# Patient Record
(demographics unavailable — no encounter records)

---

## 2025-03-17 NOTE — PHYSICAL EXAM
[de-identified] : General appearance: well-nourished and hydrated, pleasant, alert and oriented x 3, cooperative. HEENT: Normocephalic, EOM intact, Nasal septum midline, Oral cavity clear, External auditory canal clear. Cardiovascular: spider veins, no lower leg edema, no varicosities, pedal pulses are palpable. Lymphatics Lymph nodes: none palpated, Lymphedema: not present. Neurologic: sensation is normal, no muscle weakness in upper or lower extremities, patella tendon reflexes intact. Dermatologic: no apparent skin lesions, moist, warm, no rash. Spine: cervical spine appears normal and moves freely, thoracic spine appears normal and moves freely, lumbosacral spine appears normal and moves freely. Gait: nonantalgic.   Left Knee Inspection: no effusion Wounds: healed midline incision Alignment: normal. Palpation: no specific tenderness on palpation. ROM: Active (in degrees): 0-100 with no instability Ligamentous laxity (neg): increased AP translation, medial and lateral laxity 3-5 mm, negative ant. drawer test, negative post. drawer test, stable to varus stress test, stable to valgus stress test, Patellofemoral Alignment Test: Q angle-, normal. Muscle Test: good quad strength. Leg examination: calf is soft and non-tender.   Right Knee Inspection: trace effusion. Wounds: none. Alignment: normal. Palpation: medial joint line tenderness on palpation. ROM active (in degrees): 0-120 with crepitus through arc of motion. Ligamentous laxity: all ligaments appear stable, negative ant. drawer test, negative post. drawer test, stable to varus stress test, stable to valgus stress test. negative Lachman's test, negative pivot shift test Meniscal Test: negative McMurrays, negative Dutch. Patellofemoral Alignment Test: Q angle-, normal. Muscle Test: good quad strength. Leg examination: calf is soft and non-tender. [de-identified] : Left knee x-ray, AP, lateral, merchant view taken at the office today demonstrates a total knee replacement in satisfactory position and alignment. No evidence of loosening. The patella sits in a central position. No change from prior films.  Right knee x-rays, standing AP/Lateral and Merchant films, and 45-degree PA standing view, taken at the office today shows diffuse degenerative arthritis, lateral and medial joint space narrowing, significant osteophytes, patella at appropriate height and central position, patellofemoral joint space narrowing, Kellgren and Mervin grade 3-4.

## 2025-03-17 NOTE — ADDENDUM
[FreeTextEntry1] : This note was written by Deidra Mota on 03/17/2025 acting as scribe for Dr. Kashif Bautista M.D.   I, Dr. Kashif Bautista, have read and attest that all the information, medical decision making and discharge instructions within are true and accurate.

## 2025-03-17 NOTE — CONSULT LETTER
[Dear  ___] : Dear  [unfilled], [Consult Letter:] : I had the pleasure of evaluating your patient, [unfilled]. [Please see my note below.] : Please see my note below. [Consult Closing:] : Thank you very much for allowing me to participate in the care of this patient.  If you have any questions, please do not hesitate to contact me. [Sincerely,] : Sincerely, [FreeTextEntry2] : YAZAN STRICKLAND

## 2025-03-17 NOTE — DISCUSSION/SUMMARY
[de-identified] : Discussed at length the nature of the patient's condition. Their right knee symptoms appear secondary to degenerative arthritis. She is showing improvement therefore, we will continue nonoperatively. In the interim, I renewed her PT script, weight management, and Diclofenac. They can continue activities as tolerated. I did refer her to Dr. Dobbins for an evaluation of her right shoulder.   Regarding her left TKR, it is overall functioning well. I did review x-rays with them and compared them to prior films.   I will see them back in 6 months with x-rays of both knees.

## 2025-03-17 NOTE — HISTORY OF PRESENT ILLNESS
[de-identified] : NITISH JOSHUA  64 year old female presents for evaluation of right knee OA. She saw us a year ago when she was having pain for which we discussed surgery. However, she went to PT which helped significantly so she canceled surgery. Pt still attends PT once a week which helps. She walks for exercise and takes diclofenac mostly for arthritis in her feet. Regarding her left TKR done by Dr. Banda in 2013, she is doing well but does have an occasional twinge in that knee.

## 2025-04-09 NOTE — DISCUSSION/SUMMARY
[de-identified] : Right shoulder biceps tendinitis with significant tenderness in the long head of the biceps tendon.  She has positive biceps testing.  We discussed diagnostic and treatment options at length.  She requested injection today  Injection: Right shoulder biceps sheath Indication: Biceps tendinitis A discussion was had with the patient regarding this procedure and all questions were answered. All risks, benefits and alternatives were discussed. These included but were not limited to bleeding, infection, and allergic reaction. Alcohol was used to clean the skin, and betadine was used to sterilize and prep the area in the anterior aspect of the right shoulder. Ethyl chloride spray was then used as a topical anesthetic. A 21-gauge needle was used to inject 4cc of 1% lidocaine and 1cc of 40mg/ml methylprednisolone into the right biceps sheath. A sterile bandage was then applied. The patient tolerated the procedure well and there were no complications.   She will continue diclofenac as needed for pain which she has a prescription for Tylenol anti-inflammatory medications as needed for pain.  Will follow-up as needed if not improved consider MRI

## 2025-04-09 NOTE — PHYSICAL EXAM
[de-identified] : General Exam 	Well developed, well nourished 	No apparent distress 	Oriented to person, place, and time 	Mood: Normal 	Affect: Normal 	Balance and coordination: Normal 	Gait: Normal  Right shoulder exam  Inspection: No swelling, ecchymosis or gross deformity. Skin: No masses, No lesions Tenderness: + bicipital tenderness, no tenderness to the greater tuberosity/RTC insertion, no anterior shoulder/lesser tuberosity tenderness. No tenderness SC joint, clavicle, AC joint. ROM: 160/60/T6 Impingement tests: Positive Walker AC Joint: no pain with cross arm testing Biceps: pos speed Strength: 5/5 abduction, external rotation, and internal rotation Neuro: AIN, PIN, Ulnar nerve motor intact Sensation: Intact to light touch in radial, median, ulnar, and axillary nerve distributions Vasc: 2+ radial pulse [de-identified] : The following radiographs were ordered and read by me during this patients visit. I reviewed each radiograph in detail with the patient and discussed the findings as highlighted below.  3 views right shoulder were obtained today.  Glenohumeral joint is well-maintained there is normal lamina fracture
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
